# Patient Record
Sex: MALE | URBAN - METROPOLITAN AREA
[De-identification: names, ages, dates, MRNs, and addresses within clinical notes are randomized per-mention and may not be internally consistent; named-entity substitution may affect disease eponyms.]

---

## 2022-06-03 ENCOUNTER — APPOINTMENT (RX ONLY)
Dept: URBAN - METROPOLITAN AREA CLINIC 43 | Facility: CLINIC | Age: 27
Setting detail: DERMATOLOGY
End: 2022-06-03

## 2022-06-03 DIAGNOSIS — D22 MELANOCYTIC NEVI: ICD-10-CM

## 2022-06-03 PROBLEM — D22.5 MELANOCYTIC NEVI OF TRUNK: Status: ACTIVE | Noted: 2022-06-03

## 2022-06-03 PROCEDURE — 99202 OFFICE O/P NEW SF 15 MIN: CPT

## 2022-06-03 PROCEDURE — ? PHOTO-DOCUMENTATION

## 2022-06-03 PROCEDURE — ? ADDITIONAL NOTES

## 2022-06-03 PROCEDURE — ? COUNSELING

## 2022-06-03 PROCEDURE — ? OBSERVATION

## 2022-06-03 ASSESSMENT — LOCATION DETAILED DESCRIPTION DERM: LOCATION DETAILED: INFERIOR THORACIC SPINE

## 2022-06-03 ASSESSMENT — LOCATION SIMPLE DESCRIPTION DERM: LOCATION SIMPLE: UPPER BACK

## 2022-06-03 ASSESSMENT — LOCATION ZONE DERM: LOCATION ZONE: TRUNK

## 2022-06-03 NOTE — PROCEDURE: ADDITIONAL NOTES
Additional Notes: Pt reports lesion present since birth \\nHas not changed \\nPt reassured benign \\nDiscussed etiology of condition \\nDiscussed ABCDEs of melanoma. RTC if lesion changing or growing. \\nPhotos taken today \\n\\n5.3 x 2.9 cm \\n\\nInstructed pt to RTC if lesion changes or grows
Render Risk Assessment In Note?: no
Detail Level: Simple